# Patient Record
Sex: FEMALE | Race: WHITE | HISPANIC OR LATINO | Employment: FULL TIME | URBAN - METROPOLITAN AREA
[De-identification: names, ages, dates, MRNs, and addresses within clinical notes are randomized per-mention and may not be internally consistent; named-entity substitution may affect disease eponyms.]

---

## 2017-04-10 ENCOUNTER — GENERIC CONVERSION - ENCOUNTER (OUTPATIENT)
Dept: OTHER | Facility: OTHER | Age: 37
End: 2017-04-10

## 2017-04-10 DIAGNOSIS — O20.0 THREATENED ABORTION: ICD-10-CM

## 2017-04-11 LAB — HCG QUANTITATIVE (HISTORICAL): 45 MIU/ML

## 2017-04-13 ENCOUNTER — APPOINTMENT (EMERGENCY)
Dept: ULTRASOUND IMAGING | Facility: HOSPITAL | Age: 37
End: 2017-04-13
Payer: COMMERCIAL

## 2017-04-13 ENCOUNTER — HOSPITAL ENCOUNTER (EMERGENCY)
Facility: HOSPITAL | Age: 37
Discharge: HOME | End: 2017-04-13
Attending: EMERGENCY MEDICINE | Admitting: EMERGENCY MEDICINE
Payer: COMMERCIAL

## 2017-04-13 ENCOUNTER — GENERIC CONVERSION - ENCOUNTER (OUTPATIENT)
Dept: OTHER | Facility: OTHER | Age: 37
End: 2017-04-13

## 2017-04-13 VITALS
OXYGEN SATURATION: 100 % | HEART RATE: 72 BPM | SYSTOLIC BLOOD PRESSURE: 134 MMHG | RESPIRATION RATE: 17 BRPM | WEIGHT: 185 LBS | HEIGHT: 66 IN | DIASTOLIC BLOOD PRESSURE: 79 MMHG | BODY MASS INDEX: 29.73 KG/M2 | TEMPERATURE: 98.1 F

## 2017-04-13 DIAGNOSIS — O03.6: Primary | ICD-10-CM

## 2017-04-13 LAB
ABO GROUP BLD: NORMAL
ALBUMIN SERPL BCP-MCNC: 3.5 G/DL (ref 3.5–5)
ALP SERPL-CCNC: 64 U/L (ref 46–116)
ALT SERPL W P-5'-P-CCNC: 27 U/L (ref 12–78)
ANION GAP SERPL CALCULATED.3IONS-SCNC: 10 MMOL/L (ref 4–13)
AST SERPL W P-5'-P-CCNC: 18 U/L (ref 5–45)
B-HCG SERPL-ACNC: 5 MIU/ML
BACTERIA UR QL AUTO: ABNORMAL /HPF
BASOPHILS # BLD AUTO: 0.04 THOUSANDS/ΜL (ref 0–0.1)
BASOPHILS NFR BLD AUTO: 1 % (ref 0–1)
BILIRUB SERPL-MCNC: 0.5 MG/DL (ref 0.2–1)
BILIRUB UR QL STRIP: NEGATIVE
BLD GP AB SCN SERPL QL: NEGATIVE
BUN SERPL-MCNC: 10 MG/DL (ref 5–25)
CALCIUM SERPL-MCNC: 9 MG/DL (ref 8.3–10.1)
CHLORIDE SERPL-SCNC: 106 MMOL/L (ref 100–108)
CLARITY UR: ABNORMAL
CO2 SERPL-SCNC: 26 MMOL/L (ref 21–32)
COLOR UR: ABNORMAL
CREAT SERPL-MCNC: 0.83 MG/DL (ref 0.6–1.3)
EOSINOPHIL # BLD AUTO: 0.11 THOUSAND/ΜL (ref 0–0.61)
EOSINOPHIL NFR BLD AUTO: 1 % (ref 0–6)
ERYTHROCYTE [DISTWIDTH] IN BLOOD BY AUTOMATED COUNT: 11.9 % (ref 11.6–15.1)
GFR SERPL CREATININE-BSD FRML MDRD: >60 ML/MIN/1.73SQ M
GLUCOSE SERPL-MCNC: 85 MG/DL (ref 65–140)
GLUCOSE UR STRIP-MCNC: NEGATIVE MG/DL
HCT VFR BLD AUTO: 39.2 % (ref 34.8–46.1)
HGB BLD-MCNC: 12.8 G/DL (ref 11.5–15.4)
HGB UR QL STRIP.AUTO: ABNORMAL
INR PPP: 1.04 (ref 0.86–1.16)
KETONES UR STRIP-MCNC: NEGATIVE MG/DL
LEUKOCYTE ESTERASE UR QL STRIP: NEGATIVE
LYMPHOCYTES # BLD AUTO: 2.14 THOUSANDS/ΜL (ref 0.6–4.47)
LYMPHOCYTES NFR BLD AUTO: 26 % (ref 14–44)
MCH RBC QN AUTO: 29.2 PG (ref 26.8–34.3)
MCHC RBC AUTO-ENTMCNC: 32.7 G/DL (ref 31.4–37.4)
MCV RBC AUTO: 89 FL (ref 82–98)
MONOCYTES # BLD AUTO: 0.36 THOUSAND/ΜL (ref 0.17–1.22)
MONOCYTES NFR BLD AUTO: 4 % (ref 4–12)
NEUTROPHILS # BLD AUTO: 5.72 THOUSANDS/ΜL (ref 1.85–7.62)
NEUTS SEG NFR BLD AUTO: 68 % (ref 43–75)
NITRITE UR QL STRIP: NEGATIVE
NON-SQ EPI CELLS URNS QL MICRO: ABNORMAL /HPF
NRBC BLD AUTO-RTO: 0 /100 WBCS
PH UR STRIP.AUTO: 7 [PH] (ref 4.5–8)
PLATELET # BLD AUTO: 237 THOUSANDS/UL (ref 149–390)
PMV BLD AUTO: 11 FL (ref 8.9–12.7)
POTASSIUM SERPL-SCNC: 3.5 MMOL/L (ref 3.5–5.3)
PROT SERPL-MCNC: 7.4 G/DL (ref 6.4–8.2)
PROT UR STRIP-MCNC: NEGATIVE MG/DL
PROTHROMBIN TIME: 13.5 SECONDS (ref 12–14.3)
RBC # BLD AUTO: 4.39 MILLION/UL (ref 3.81–5.12)
RBC #/AREA URNS AUTO: ABNORMAL /HPF
RH BLD: POSITIVE
SODIUM SERPL-SCNC: 142 MMOL/L (ref 136–145)
SP GR UR STRIP.AUTO: 1.01 (ref 1–1.03)
UROBILINOGEN UR QL STRIP.AUTO: 1 E.U./DL
WBC # BLD AUTO: 8.39 THOUSAND/UL (ref 4.31–10.16)
WBC #/AREA URNS AUTO: ABNORMAL /HPF

## 2017-04-13 PROCEDURE — 99284 EMERGENCY DEPT VISIT MOD MDM: CPT

## 2017-04-13 PROCEDURE — 86900 BLOOD TYPING SEROLOGIC ABO: CPT | Performed by: EMERGENCY MEDICINE

## 2017-04-13 PROCEDURE — 36415 COLL VENOUS BLD VENIPUNCTURE: CPT | Performed by: EMERGENCY MEDICINE

## 2017-04-13 PROCEDURE — 80053 COMPREHEN METABOLIC PANEL: CPT | Performed by: EMERGENCY MEDICINE

## 2017-04-13 PROCEDURE — 87086 URINE CULTURE/COLONY COUNT: CPT | Performed by: EMERGENCY MEDICINE

## 2017-04-13 PROCEDURE — 85610 PROTHROMBIN TIME: CPT | Performed by: EMERGENCY MEDICINE

## 2017-04-13 PROCEDURE — 86901 BLOOD TYPING SEROLOGIC RH(D): CPT | Performed by: EMERGENCY MEDICINE

## 2017-04-13 PROCEDURE — 84702 CHORIONIC GONADOTROPIN TEST: CPT | Performed by: EMERGENCY MEDICINE

## 2017-04-13 PROCEDURE — 81001 URINALYSIS AUTO W/SCOPE: CPT | Performed by: EMERGENCY MEDICINE

## 2017-04-13 PROCEDURE — 85025 COMPLETE CBC W/AUTO DIFF WBC: CPT | Performed by: EMERGENCY MEDICINE

## 2017-04-13 PROCEDURE — 86850 RBC ANTIBODY SCREEN: CPT | Performed by: EMERGENCY MEDICINE

## 2017-04-13 PROCEDURE — 76815 OB US LIMITED FETUS(S): CPT

## 2017-04-14 ENCOUNTER — GENERIC CONVERSION - ENCOUNTER (OUTPATIENT)
Dept: OTHER | Facility: OTHER | Age: 37
End: 2017-04-14

## 2017-04-14 LAB — BACTERIA UR CULT: NORMAL

## 2017-04-18 ENCOUNTER — ALLSCRIPTS OFFICE VISIT (OUTPATIENT)
Dept: OTHER | Facility: OTHER | Age: 37
End: 2017-04-18

## 2017-04-25 ENCOUNTER — ALLSCRIPTS OFFICE VISIT (OUTPATIENT)
Dept: OTHER | Facility: OTHER | Age: 37
End: 2017-04-25

## 2017-04-27 ENCOUNTER — ALLSCRIPTS OFFICE VISIT (OUTPATIENT)
Dept: OTHER | Facility: OTHER | Age: 37
End: 2017-04-27

## 2017-04-27 PROCEDURE — 87591 N.GONORRHOEAE DNA AMP PROB: CPT | Performed by: OBSTETRICS & GYNECOLOGY

## 2017-04-27 PROCEDURE — 87661 TRICHOMONAS VAGINALIS AMPLIF: CPT | Performed by: OBSTETRICS & GYNECOLOGY

## 2017-04-27 PROCEDURE — 87491 CHLMYD TRACH DNA AMP PROBE: CPT | Performed by: OBSTETRICS & GYNECOLOGY

## 2017-04-28 ENCOUNTER — TRANSCRIBE ORDERS (OUTPATIENT)
Dept: LAB | Facility: HOSPITAL | Age: 37
End: 2017-04-28

## 2017-04-28 ENCOUNTER — LAB REQUISITION (OUTPATIENT)
Dept: LAB | Facility: HOSPITAL | Age: 37
End: 2017-04-28
Payer: COMMERCIAL

## 2017-04-28 DIAGNOSIS — R10.2 PELVIC AND PERINEAL PAIN: ICD-10-CM

## 2017-04-28 DIAGNOSIS — R10.2 ADNEXAL TENDERNESS, RIGHT: Primary | ICD-10-CM

## 2017-05-05 LAB
CHLAMYDIA DNA CVX QL NAA+PROBE: NORMAL
N GONORRHOEA DNA GENITAL QL NAA+PROBE: NORMAL

## 2017-05-09 ENCOUNTER — ALLSCRIPTS OFFICE VISIT (OUTPATIENT)
Dept: OTHER | Facility: OTHER | Age: 37
End: 2017-05-09

## 2017-05-15 LAB — MISCELLANEOUS LAB TEST RESULT: NORMAL

## 2017-06-13 ENCOUNTER — ALLSCRIPTS OFFICE VISIT (OUTPATIENT)
Dept: OTHER | Facility: OTHER | Age: 37
End: 2017-06-13

## 2018-01-13 VITALS
WEIGHT: 188 LBS | DIASTOLIC BLOOD PRESSURE: 66 MMHG | HEIGHT: 64 IN | BODY MASS INDEX: 32.1 KG/M2 | SYSTOLIC BLOOD PRESSURE: 116 MMHG

## 2018-01-13 VITALS — HEIGHT: 64 IN | BODY MASS INDEX: 32.44 KG/M2 | WEIGHT: 190 LBS

## 2018-01-13 VITALS
HEART RATE: 84 BPM | BODY MASS INDEX: 32.85 KG/M2 | SYSTOLIC BLOOD PRESSURE: 110 MMHG | DIASTOLIC BLOOD PRESSURE: 70 MMHG | WEIGHT: 191.38 LBS

## 2018-01-13 NOTE — RESULT NOTES
Verified Results  (B) PAP WITH HPV PLUS AND GONORRHEA AND CHLAMYDIA [id number] 12:00AM Negro Maldonado     Test Name Result Flag Reference   PAP, LIQUID-BASED ASC-US A    DIAGNOSIS:            Atypical squamous cells of undetermined                         significance (ASC-US)  ADEQUACY:             Satisfactory for evaluation /                         Endocervical/transformation zone component                         present  Scant cellularity  COMMENT:              This Pap smear was screened with the assistance                         of the [employer] ThinPrep(TM) Imaging System and                         screened by a cytotechnologist   SPECIMEN SOURCE:      PAP + HPV HIGH RISK + CT/GC, CERVIX  CLINICAL INFORMATION: LMP: 11/5/2016                        Provided Diagnosis Codes: Z01 419,Z11 3,Z11 51                                                Cervicovaginal cytology should be considered a                         screening procedure subject to false negatives                         and false positives  Results are more reliable                         when a satisfactory sample is obtained on a                         regular repetitive basis, and should be                         interpreted together with past and current                         clinical data  ELECTRONICALLY SIGNED   BY:                   Pathologist Review By: SERENITY Otero  Case                         Electronically Signed 11/11/2016   HPV HR (NON 16/18) Detected A    HPV 18+ Not Detected     HPV16+ Not Detected     CHLAMYDIA, LIQUID-BASED Not Detected     GONORRHEA, LIQUID-BASED Not Detected     HPV HR(NON 16/18) (2,5,6)  HPV 18+ (2,4,5,6)  HPV16+ (2,4,5,6)  CHLAMYDIA, LIQUID-BASED (3,6)  GONORRHEA, LIQUID-BASED (1,3,6)  (1)  Rare cross-reactivity may occur in this amplified DNA GC assay due to   certain strains of N  cinerea, N  subflava and N  lactamica    (2)  The jesse(R) HPV test is FDA-cleared for ThinPrep(R) specimens and detects genomic HPV DNA in the polymorphic L1 region in 14 subtypes:   Type 16, Type 18, and other high risk types   (08,15,37,67,35,95,33,36,80,21,99,60)  The test has been modified and   validated for use in SurePath(TM) specimens  (3)  Chlamydia trachomatis (CT) and Neisseria gonorrhoeae (NG)  qualitative   detection is performed on urogenital specimens using an FDA approved   platform  -  Starbates(TM) Qx Amplified DNA Assay tested with the BD Viper(TM)   System using Strand Displacement Amplification technology  -  The Rabixo Combo 2(R) Assay detects ribosomal RNA (rRNA) using   target capture and Transcription-Mediated Amplification (TMA)   technology  -  The jesse(R) CT/NG v2 0 detects DNA using Polymerase Chain Reaction   (PCR) technology  (4)  HPV types 16 and/or 18 that were Not Detected were undetectable or   below the pre-set threshold  (5)  The non-repeat rate for HPV genotyping assays varies from 5 to 15%  In   the NILM cytology category, there is a low positive predictive value   (PPV = 15-20%) for CIN2+ with a positive high risk HPV result  (6)  Results should be interpreted together with past and current clinical   and laboratory data

## 2018-01-14 VITALS
RESPIRATION RATE: 14 BRPM | SYSTOLIC BLOOD PRESSURE: 110 MMHG | BODY MASS INDEX: 31.92 KG/M2 | HEIGHT: 64 IN | HEART RATE: 66 BPM | TEMPERATURE: 98.5 F | WEIGHT: 187 LBS | DIASTOLIC BLOOD PRESSURE: 70 MMHG

## 2018-01-14 VITALS
DIASTOLIC BLOOD PRESSURE: 70 MMHG | HEIGHT: 64 IN | WEIGHT: 185 LBS | SYSTOLIC BLOOD PRESSURE: 112 MMHG | BODY MASS INDEX: 31.58 KG/M2

## 2018-01-15 NOTE — RESULT NOTES
Verified Results  Camilo Kevin Pathology Report [id number] 12:00AM Regina Kimball     Test Name Result Flag Reference   [facility] PATHOLOGY REPORT ATYPICAL A      [address] REPORT   Caren Meyer PATIENT ID:      [id number]     SPECIMEN SOURCE:      Cervix, Endocervix     CLINICAL DATA:      796 9, 795 05, R87 810      CLINICAL:     A  ENDOCERVICAL CURETTAGE     B  CERVICAL BIOPSY 6 O'CLOCK     C  CERVICAL BIOPSY 12 O'CLOCK     GROSS:     A  RECEIVED IN FORMALIN AND LABELED WITH PATIENT          IDENTIFICATION, IS AN AGGREGATE OF TAN, SOFT MUCOID TISSUE          MEASURING 1 0 X 1 0 X 0 3 CM  ENTIRELY SUBMITTED IN ONE          CASSETTE  B  RECEIVED IN FORMALIN AND LABELED WITH PATIENT          IDENTIFICATION, IS ONE TAN-WHITE PIECE OF TISSUE MEASURING          1 1 X 0 3 X 0 3 CM  ACCOMPANYING FRAGMENTS ARE ALSO          INCLUDED  ENTIRELY SUBMITTED IN ONE CASSETTE  C  RECEIVED IN FORMALIN AND LABELED WITH PATIENT          IDENTIFICATION, IS ONE TAN-WHITE PIECE OF TISSUE MEASURING          0 6 X 0 2 X 0 2 CM  ACCOMPANYING FRAGMENTS ARE ALSO          INCLUDED  ENTIRELY SUBMITTED IN ONE CASSETTE  DIAGNOSIS:     A  ENDOCERVICAL TISSUE, NEGATIVE FOR DYSPLASIA  B  ENDOCERVICAL AND EXOCERVICAL TISSUE WITH MILD CHRONIC          CERVICITIS AND SQUAMOUS METAPLASIA, NEGATIVE FOR DYSPLASIA  C  LOW GRADE SQUAMOUS INTRAEPITHELIAL LESION (LSIL, SHIVAM 1)  PREVIOUS PAP RESULT:  Report Date  Specimen Id  Diagnosis   ------------ ------------ ------------------------------------------  11/11/2016   [id number]    Atypical squamous cells of undetermined                               significance (ASC-US)                                                 SERENITY Clark  PATHOLOGIST                      This report was electronically signed                                              FINAL REPORT

## 2018-01-17 NOTE — RESULT NOTES
Verified Results  (1) HCG QUANT [id number] 11:58AM Lynn Catherine   REPORT COMMENT:  FASTING:NO     Test Name Result Flag Reference   HCG, TOTAL, QN 45 mIU/mL H    Reference Range  Nonpregnant or premenopausal    <5  Postmenopausal                 <10     Values from different assay methods may vary  The use of this assay to monitor or to diagnose   patients with cancer or any condition unrelated  to pregnancy has not been cleared or approved by  the FDA or the  of the assay

## 2018-01-18 NOTE — MISCELLANEOUS
Message   Recorded as Task   Date: 2017 10:02 AM, Created By: Sharonda Patel   Task Name: Call Back   Assigned To: Sg Mendosa   Regarding Patient: David Pichardo, Status: In Progress   Comment:    Clara Seymour  10:02 AM     TASK CREATED  Caller: Self; Results Inquiry; [phone] (Home)  pls call pt, she would like her blood results which were done @ [facility] MON 4/10/17,  she is bleeding, concerned & will soon go to er,  pts # [phone]   Rosemary Neal  10:14 AM     TASK IN PROGRESS   Rosemary Neal  10:18 AM     TASK EDITED  Tried to call - answered first time but kept breaking in and out  Was able to hear that she is currently in the ER - tried to verify which one and get more info but line was cut off  Tried to call again but no answer - vm was not set up  Will try again later to find out what is going on  Clara Seymour  12:47 PM     TASK EDITED  pt went to [facility] ER, she had miscarriage, set appt for fu er to miscarriage with G87 in [place]   Rosemary Neal  12:57 PM     TASK EDITED  copied messages to note  Active Problems    1  ASCUS with positive high risk HPV (796 9)   2  Cervical high risk HPV (human papillomavirus) test positive (795 05) (R87 810)   3  Postcoital bleeding (626 7) (N93 0)   4  Threatened  (640 00) (O20 0)    Current Meds   1  No Reported Medications Recorded    Allergies    1   No Known Drug Allergies    Signatures   Electronically signed by : Oda Osler, ; 2017 12:58PM EST                       (Author)

## 2019-01-09 ENCOUNTER — OFFICE VISIT (OUTPATIENT)
Dept: OBGYN CLINIC | Facility: MEDICAL CENTER | Age: 39
End: 2019-01-09
Payer: COMMERCIAL

## 2019-01-09 VITALS — WEIGHT: 190.8 LBS | DIASTOLIC BLOOD PRESSURE: 86 MMHG | SYSTOLIC BLOOD PRESSURE: 122 MMHG | BODY MASS INDEX: 32.75 KG/M2

## 2019-01-09 DIAGNOSIS — Z87.42 H/O ABNORMAL CERVICAL PAPANICOLAOU SMEAR: ICD-10-CM

## 2019-01-09 DIAGNOSIS — Z30.432 ENCOUNTER FOR IUD REMOVAL: Primary | ICD-10-CM

## 2019-01-09 DIAGNOSIS — Z30.09 ENCOUNTER FOR COUNSELING REGARDING INITIATION OF OTHER CONTRACEPTIVE MEASURE: ICD-10-CM

## 2019-01-09 DIAGNOSIS — Z01.419 ENCOUNTER FOR GYNECOLOGICAL EXAMINATION WITHOUT ABNORMAL FINDING: ICD-10-CM

## 2019-01-09 PROCEDURE — 99213 OFFICE O/P EST LOW 20 MIN: CPT | Performed by: PHYSICIAN ASSISTANT

## 2019-01-09 PROCEDURE — 87624 HPV HI-RISK TYP POOLED RSLT: CPT | Performed by: PHYSICIAN ASSISTANT

## 2019-01-09 PROCEDURE — G0143 SCR C/V CYTO,THINLAYER,RESCR: HCPCS | Performed by: PHYSICIAN ASSISTANT

## 2019-01-09 PROCEDURE — 58301 REMOVE INTRAUTERINE DEVICE: CPT | Performed by: PHYSICIAN ASSISTANT

## 2019-01-09 RX ORDER — NORETHINDRONE ACETATE AND ETHINYL ESTRADIOL 1MG-20(21)
1 KIT ORAL DAILY
Qty: 90 TABLET | Refills: 4 | Status: SHIPPED | OUTPATIENT
Start: 2019-01-09 | End: 2019-07-30 | Stop reason: ALTCHOICE

## 2019-01-09 NOTE — PROGRESS NOTES
Iud removal  Date/Time: 1/9/2019 6:22 PM  Performed by: Sudarshan Jackson by: Cristal Reeec     Consent:     Consent obtained:  Written    Consent given by:  Patient    Procedure risks and benefits discussed: yes      Patient questions answered: yes      Patient agrees, verbalizes understanding, and wants to proceed: yes    Procedure:     Removed with no complications: yes      Other reason for removal:  Irreg bleeding and dyspareunia

## 2019-01-09 NOTE — PROGRESS NOTES
Assessment/Plan:    H/O abnormal cervical Papanicolaou smear  H/o abnormal PAP (ASCUS +other high risk HPV) - has not f/u since that time  PAP collected today    Encounter for IUD removal  See procedure note    Encounter for counseling regarding initiation of other contraceptive measure     Contraceptive options were reviewed, including hormonal methods, both combination (pill, patch, vaginal ring) and progesterone-only (pill, Depo Provera and Nexplanon), intrauterine devices (Mirena, Superior, Kerri Glee, and Paragard), barrier methods (condoms, diaphragm) and male/female sterilization  The mechanisms, risks, benefits and side effects of all methods were discussed  All questions have been answered to her satisfaction  Interested in OCP start  No h/o VTE, migraine  Non smoker  Reviewed taking same time daily  F/u PRN       Diagnoses and all orders for this visit:    Encounter for IUD removal    Encounter for counseling regarding initiation of other contraceptive measure  -     norethindrone-ethinyl estradiol (JUNEL FE 1/20) 1-20 MG-MCG per tablet; Take 1 tablet by mouth daily    Encounter for gynecological examination without abnormal finding  -     Liquid-based pap, screening    H/O abnormal cervical Papanicolaou smear          Subjective:      Patient ID: Karli Oliveros is a 41 y o  female      Pt presents for IUD removal  States she's been experiencing pain w/ IC and irreg bleeding since IUD insertion in 2017 (was confirmed in correct position w/ US and was tx for possible PID - STD testing was neg)  Requests removal at this time    Interested in OCP start following removal  Declines screening for STD - monogamous w/     Also has not f/u since abnormal PAP in 2016 (ASCUS +other high risk HPV)                The following portions of the patient's history were reviewed and updated as appropriate: allergies, current medications, past family history, past medical history, past social history, past surgical history and problem list     Review of Systems   Constitutional: Negative for appetite change, fatigue and unexpected weight change  Respiratory: Negative for chest tightness and shortness of breath  Cardiovascular: Negative for chest pain, palpitations and leg swelling  Gastrointestinal: Negative for abdominal pain, constipation, diarrhea, nausea and vomiting  Genitourinary: Positive for dyspareunia and menstrual problem  Negative for difficulty urinating, pelvic pain and vaginal discharge  Musculoskeletal: Negative for arthralgias and myalgias  Neurological: Negative for dizziness, light-headedness and headaches  Psychiatric/Behavioral: Negative for dysphoric mood  The patient is not nervous/anxious  All other systems reviewed and are negative  Objective:      /86 (BP Location: Left arm, Patient Position: Sitting, Cuff Size: Standard)   Wt 86 5 kg (190 lb 12 8 oz)   BMI 32 75 kg/m²          Physical Exam   Constitutional: She is oriented to person, place, and time  She appears well-developed and well-nourished  HENT:   Head: Normocephalic and atraumatic  Abdominal: Soft  There is no tenderness  Hernia confirmed negative in the right inguinal area and confirmed negative in the left inguinal area  Genitourinary: Vagina normal and uterus normal  Pelvic exam was performed with patient supine  There is no rash, tenderness, lesion or injury on the right labia  There is no rash, tenderness, lesion or injury on the left labia  Cervix exhibits no motion tenderness, no discharge and no friability  Right adnexum displays no mass, no tenderness and no fullness  Left adnexum displays no mass, no tenderness and no fullness  No erythema, tenderness or bleeding in the vagina  No signs of injury around the vagina  No vaginal discharge found  Genitourinary Comments: IUD strings visible   Neurological: She is alert and oriented to person, place, and time  Skin: Skin is warm and dry     Psychiatric: She has a normal mood and affect  Nursing note and vitals reviewed

## 2019-01-09 NOTE — ASSESSMENT & PLAN NOTE
Contraceptive options were reviewed, including hormonal methods, both combination (pill, patch, vaginal ring) and progesterone-only (pill, Depo Provera and Nexplanon), intrauterine devices (Mirena, Superior, Verlan Chill, and Paragard), barrier methods (condoms, diaphragm) and male/female sterilization  The mechanisms, risks, benefits and side effects of all methods were discussed  All questions have been answered to her satisfaction  Interested in OCP start  No h/o VTE, migraine   Non smoker  Reviewed taking same time daily  F/u PRN

## 2019-01-10 LAB
HPV HR 12 DNA CVX QL NAA+PROBE: NEGATIVE
HPV16 DNA CVX QL NAA+PROBE: NEGATIVE
HPV18 DNA CVX QL NAA+PROBE: NEGATIVE

## 2019-01-14 LAB
LAB AP GYN PRIMARY INTERPRETATION: NORMAL
Lab: NORMAL

## 2019-01-15 ENCOUNTER — TELEPHONE (OUTPATIENT)
Dept: OBGYN CLINIC | Facility: CLINIC | Age: 39
End: 2019-01-15

## 2019-01-15 NOTE — TELEPHONE ENCOUNTER
Patient called back  Reviewed pap results with her  Patient has questions regarding irregular bleeding  Had IUD removed last week  Started Junel that evening  Started having some bleeding so she stopped the pill because she thought is was her period  Did not take pill x 2 days  Advised patient to take 2 pills today and 2 pills tomorrow in divided doses  May cause nausea  Advised back up protection for the rest of this cycle  Reviewed breakthrough bleeding  Stressed importance of taking her pill every day at approx the same time  Explained she will have her period during the placebo week  Patient verbalizes understanding

## 2019-01-15 NOTE — TELEPHONE ENCOUNTER
----- Message from Sophia Benson PA-C sent at 1/15/2019  7:43 AM EST -----  Cytology WNL  HPV results neg  Please notify pt  thanks

## 2019-03-26 ENCOUNTER — TELEPHONE (OUTPATIENT)
Dept: OBGYN CLINIC | Facility: CLINIC | Age: 39
End: 2019-03-26

## 2019-03-26 DIAGNOSIS — O20.9 BLEEDING IN EARLY PREGNANCY: Primary | ICD-10-CM

## 2019-03-26 NOTE — TELEPHONE ENCOUNTER
Patient is aware order for the HCG order in her chart and faxing it to [facility] on [address]

## 2019-03-26 NOTE — TELEPHONE ENCOUNTER
Spoke with pt - she had the IUD removed in January due to irregular bleeding  Since having it removed, she has been taking Junel for her birth control - missed pills and did not use back up birth control the first month starting the OCP  She has 1 period that lasted 3 days and was very light, but now she is bleeding during intercourse - only see's when wiping  NO blood in the toilet  She took an HPT - not sure if it was positive or negative  She would like to do an HCG  OK to order? Please advise  Thanks!

## 2019-03-26 NOTE — TELEPHONE ENCOUNTER
Pt had her iud removed in [facility] had one period for 3 days - very light; now she is bleeding with intercourse  She had a miscarriage 2 yrs ago-she feels like it's the same thing again  She had 5 children and her results don't show up on the home pt

## 2019-03-28 ENCOUNTER — TELEPHONE (OUTPATIENT)
Dept: OBGYN CLINIC | Facility: CLINIC | Age: 39
End: 2019-03-28

## 2019-03-28 LAB — B-HCG SERPL-ACNC: <2 MIU/ML

## 2019-03-28 NOTE — TELEPHONE ENCOUNTER
Patient called - she is aware that her HCG is negative  She wanted to Paula Schmidt to discuss her irregular bleeding  Made an appointment for her for 04/02

## 2019-03-29 ENCOUNTER — TELEPHONE (OUTPATIENT)
Dept: OBGYN CLINIC | Facility: CLINIC | Age: 39
End: 2019-03-29

## 2019-03-29 NOTE — TELEPHONE ENCOUNTER
----- Message from [facility] sent at 3/29/2019  8:01 AM EDT -----  Pls advise HCG is NEGATIVE  Thanks

## 2019-07-30 ENCOUNTER — OFFICE VISIT (OUTPATIENT)
Dept: OBGYN CLINIC | Facility: MEDICAL CENTER | Age: 39
End: 2019-07-30
Payer: COMMERCIAL

## 2019-07-30 VITALS — SYSTOLIC BLOOD PRESSURE: 128 MMHG | DIASTOLIC BLOOD PRESSURE: 76 MMHG | BODY MASS INDEX: 31.1 KG/M2 | WEIGHT: 181.2 LBS

## 2019-07-30 DIAGNOSIS — L91.8 SKIN TAG: ICD-10-CM

## 2019-07-30 DIAGNOSIS — B37.9 CANDIDIASIS: Primary | ICD-10-CM

## 2019-07-30 PROCEDURE — 11200 RMVL SKIN TAGS UP TO&INC 15: CPT | Performed by: PHYSICIAN ASSISTANT

## 2019-07-30 PROCEDURE — 99213 OFFICE O/P EST LOW 20 MIN: CPT | Performed by: PHYSICIAN ASSISTANT

## 2019-07-30 RX ORDER — NYSTATIN AND TRIAMCINOLONE ACETONIDE 100000; 1 [USP'U]/G; MG/G
OINTMENT TOPICAL 2 TIMES DAILY
Qty: 30 G | Refills: 0 | Status: SHIPPED | OUTPATIENT
Start: 2019-07-30 | End: 2020-09-29

## 2019-07-30 RX ORDER — ALBUTEROL SULFATE 2.5 MG/3ML
SOLUTION RESPIRATORY (INHALATION)
COMMUNITY

## 2019-07-30 RX ORDER — FLUTICASONE PROPIONATE 50 MCG
SPRAY, SUSPENSION (ML) NASAL
COMMUNITY

## 2019-07-30 NOTE — ASSESSMENT & PLAN NOTE
Small skin tag on back of L leg - TCA applied topically - pt tolerated well  Aftercare instructions reviewed

## 2019-07-30 NOTE — ASSESSMENT & PLAN NOTE
Exam findings c/w candidiasis  C/o pruritus - hydrocortisone helping  Given topical mycolog, discussed use until area improved    rev'd proper hygiene practices, keeping area clean/dry as able

## 2019-07-30 NOTE — PROGRESS NOTES
Assessment/Plan:    Skin tag  Small skin tag on back of L leg - TCA applied topically - pt tolerated well  Aftercare instructions reviewed      Candidiasis  Exam findings c/w candidiasis  C/o pruritus - hydrocortisone helping  Given topical mycolog, discussed use until area improved    rev'd proper hygiene practices, keeping area clean/dry as able         Diagnoses and all orders for this visit:    Candidiasis  -     nystatin-triamcinolone (MYCOLOG-II) ointment; Apply topically 2 (two) times a day    Skin tag    Other orders  -     fluticasone (FLONASE) 50 mcg/act nasal spray; fluticasone propionate 50 mcg/actuation nasal spray,suspension  -     VENTOLIN  (90 Base) MCG/ACT inhaler; Inhale 2 puffs every 4 (four) hours as needed  -     albuterol (2 5 mg/3 mL) 0 083 % nebulizer solution; albuterol sulfate 2 5 mg/3 mL (0 083 %) solution for nebulization        Subjective:      Patient ID: Jason Alberto is a 46 y o  female  Pt presents for eval of pruritic rash, skin tag on back of thigh  For last several days, had noted increasing pruritus in groin area bilaterally  Applied hydrocortisone 1% and did help w/ pruritus    No vag d/c, irreg bleeding, pelvic pain    Admits to increased sweating, trying to keep area clean/dry    Also has 'lump' on back of thigh would like to have removed        The following portions of the patient's history were reviewed and updated as appropriate: allergies, current medications, past family history, past medical history, past social history, past surgical history and problem list     Review of Systems   Constitutional: Negative for appetite change, fatigue and unexpected weight change  Respiratory: Negative for chest tightness and shortness of breath  Cardiovascular: Negative for chest pain, palpitations and leg swelling  Gastrointestinal: Negative for abdominal pain, constipation, diarrhea, nausea and vomiting     Genitourinary: Negative for difficulty urinating, dyspareunia, menstrual problem, pelvic pain and vaginal discharge  Musculoskeletal: Negative for arthralgias and myalgias  Skin: Positive for rash  Neurological: Negative for dizziness, light-headedness and headaches  Psychiatric/Behavioral: Negative for dysphoric mood  The patient is not nervous/anxious  All other systems reviewed and are negative  Objective:      /76   Wt 82 2 kg (181 lb 3 2 oz)   LMP  (Within Weeks)   Breastfeeding? No   BMI 31 10 kg/m²          Physical Exam   Constitutional: She is oriented to person, place, and time  She appears well-developed and well-nourished  HENT:   Head: Normocephalic and atraumatic  Genitourinary: Pelvic exam was performed with patient supine  Genitourinary Comments: Hyperpigmented rash noted in inguinal folds b/l     Neurological: She is alert and oriented to person, place, and time  Skin: Skin is warm and dry  Psychiatric: She has a normal mood and affect  Nursing note and vitals reviewed

## 2019-11-12 ENCOUNTER — OFFICE VISIT (OUTPATIENT)
Dept: OBGYN CLINIC | Facility: MEDICAL CENTER | Age: 39
End: 2019-11-12
Payer: COMMERCIAL

## 2019-11-12 VITALS — SYSTOLIC BLOOD PRESSURE: 118 MMHG | DIASTOLIC BLOOD PRESSURE: 66 MMHG | WEIGHT: 184 LBS | BODY MASS INDEX: 31.58 KG/M2

## 2019-11-12 DIAGNOSIS — N93.0 PCB (POST COITAL BLEEDING): ICD-10-CM

## 2019-11-12 DIAGNOSIS — Z11.3 SCREENING EXAMINATION FOR STD (SEXUALLY TRANSMITTED DISEASE): Primary | ICD-10-CM

## 2019-11-12 DIAGNOSIS — Z30.41 ORAL CONTRACEPTIVE PILL SURVEILLANCE: ICD-10-CM

## 2019-11-12 PROCEDURE — 99214 OFFICE O/P EST MOD 30 MIN: CPT | Performed by: PHYSICIAN ASSISTANT

## 2019-11-12 PROCEDURE — 87660 TRICHOMONAS VAGIN DIR PROBE: CPT | Performed by: PHYSICIAN ASSISTANT

## 2019-11-12 PROCEDURE — 87480 CANDIDA DNA DIR PROBE: CPT | Performed by: PHYSICIAN ASSISTANT

## 2019-11-12 PROCEDURE — 87591 N.GONORRHOEAE DNA AMP PROB: CPT | Performed by: PHYSICIAN ASSISTANT

## 2019-11-12 PROCEDURE — 87510 GARDNER VAG DNA DIR PROBE: CPT | Performed by: PHYSICIAN ASSISTANT

## 2019-11-12 PROCEDURE — 87491 CHLMYD TRACH DNA AMP PROBE: CPT | Performed by: PHYSICIAN ASSISTANT

## 2019-11-12 RX ORDER — LEVONORGESTREL AND ETHINYL ESTRADIOL 0.15-0.03
1 KIT ORAL DAILY
Qty: 90 TABLET | Refills: 4 | Status: SHIPPED | OUTPATIENT
Start: 2019-11-12 | End: 2020-09-29

## 2019-11-12 NOTE — ASSESSMENT & PLAN NOTE
Will r/o infection, cx collected today  Will send for imaging, f/u with results when available  Cervical friability noted on exam, possible ectropion d/t hormonal contra use    If workup WNL, disc d/c use of OCPs, tx w/ silver nitrate, cryo, or ablation to help reduce recurrence of PCB

## 2019-11-12 NOTE — PROGRESS NOTES
Assessment/Plan:    PCB (post coital bleeding)  Will r/o infection, cx collected today  Will send for imaging, f/u with results when available  Cervical friability noted on exam, possible ectropion d/t hormonal contra use    If workup WNL, disc d/c use of OCPs, tx w/ silver nitrate, cryo, or ablation to help reduce recurrence of PCB       Diagnoses and all orders for this visit:    Screening examination for STD (sexually transmitted disease)  -     VAGINOSIS DNA PROBE (AFFIRM)  -     Chlamydia/GC amplified DNA by PCR    PCB (post coital bleeding)  -     US pelvis complete w transvaginal; Future    Oral contraceptive pill surveillance  -     levonorgestrel-ethinyl estradiol (NORDETTE) 0 15-30 MG-MCG per tablet; Take 1 tablet by mouth daily        Subjective:      Patient ID: Matteo Clifton is a 46 y o  female  Pt presents for eval of postcoital bleeding  S/p Mirena IUD removal in Jan 2019 d/t ongoing cramping/pain/irreg bleeding  Was hopeful that irreg bleeding/PCB would resolve after removal but has persisted  Currently taking COCPs for contra, taking same time daily, no skipped pills  Notes PCB occurring w/ every sexual encounter w/ , blood bright red, resolves shortly after  Denies pain w/ bleeding  Monogamous, no abn d/c noted  No pelvic pain   Denies any urinary symptoms  No change in bowel/bladder habits          The following portions of the patient's history were reviewed and updated as appropriate: allergies, current medications, past family history, past medical history, past social history, past surgical history and problem list     Review of Systems   Constitutional: Negative for appetite change, fatigue and unexpected weight change  Respiratory: Negative for chest tightness and shortness of breath  Cardiovascular: Negative for chest pain, palpitations and leg swelling  Gastrointestinal: Negative for abdominal pain, constipation, diarrhea, nausea and vomiting     Genitourinary: Positive for vaginal bleeding  Negative for difficulty urinating, dyspareunia, menstrual problem, pelvic pain and vaginal discharge  Musculoskeletal: Negative for arthralgias and myalgias  Neurological: Negative for dizziness, light-headedness and headaches  Psychiatric/Behavioral: Negative for dysphoric mood  The patient is not nervous/anxious  All other systems reviewed and are negative  Objective:      /66   Wt 83 5 kg (184 lb)   LMP  (LMP Unknown)   Breastfeeding? No   BMI 31 58 kg/m²          Physical Exam   Constitutional: She is oriented to person, place, and time  She appears well-developed and well-nourished  HENT:   Head: Normocephalic and atraumatic  Abdominal: Soft  There is no tenderness  Hernia confirmed negative in the right inguinal area and confirmed negative in the left inguinal area  Genitourinary: Vagina normal and uterus normal  Pelvic exam was performed with patient supine  There is no rash, tenderness, lesion or injury on the right labia  There is no rash, tenderness, lesion or injury on the left labia  Cervix exhibits friability  Cervix exhibits no motion tenderness and no discharge  Right adnexum displays no mass, no tenderness and no fullness  Left adnexum displays no mass, no tenderness and no fullness  No erythema, tenderness or bleeding in the vagina  No signs of injury around the vagina  No vaginal discharge found  Neurological: She is alert and oriented to person, place, and time  Skin: Skin is warm and dry  Psychiatric: She has a normal mood and affect  Nursing note and vitals reviewed

## 2019-11-14 ENCOUNTER — TELEPHONE (OUTPATIENT)
Dept: OBGYN CLINIC | Facility: CLINIC | Age: 39
End: 2019-11-14

## 2019-11-14 LAB
C TRACH DNA SPEC QL NAA+PROBE: NEGATIVE
CANDIDA RRNA VAG QL PROBE: NEGATIVE
G VAGINALIS RRNA GENITAL QL PROBE: NEGATIVE
N GONORRHOEA DNA SPEC QL NAA+PROBE: NEGATIVE
T VAGINALIS RRNA GENITAL QL PROBE: NEGATIVE

## 2019-11-18 ENCOUNTER — HOSPITAL ENCOUNTER (OUTPATIENT)
Dept: ULTRASOUND IMAGING | Facility: HOSPITAL | Age: 39
Discharge: HOME | End: 2019-11-18
Payer: COMMERCIAL

## 2019-11-18 DIAGNOSIS — N93.0 PCB (POST COITAL BLEEDING): ICD-10-CM

## 2019-11-18 PROCEDURE — 76830 TRANSVAGINAL US NON-OB: CPT

## 2019-11-18 PROCEDURE — 76856 US EXAM PELVIC COMPLETE: CPT

## 2019-11-20 ENCOUNTER — TELEPHONE (OUTPATIENT)
Dept: OBGYN CLINIC | Facility: CLINIC | Age: 39
End: 2019-11-20

## 2019-11-20 NOTE — TELEPHONE ENCOUNTER
Stella Dickinson from [facility] radiology called with significant findings on pelvic ultasound

## 2019-11-26 ENCOUNTER — TELEPHONE (OUTPATIENT)
Dept: OBGYN CLINIC | Facility: CLINIC | Age: 39
End: 2019-11-26

## 2019-11-26 NOTE — TELEPHONE ENCOUNTER
----- Message from [address] [place] [address] sent at 11/26/2019 11:16 AM EST -----  This is Briseida's patient  Please notify that there is a possible endometrial polyp present  I would recommend patient schedule an office visit with a physician to discuss possible D and C hysteroscopy  Thank you!

## 2019-11-27 NOTE — TELEPHONE ENCOUNTER
Pt contacted and advised as directed  Pt scheduled for wind agp at 3:40pm 12/05 with dr Festus Taylor as pt has seen dr Festus Taylor prior and prefers provider and location

## 2019-12-05 ENCOUNTER — OFFICE VISIT (OUTPATIENT)
Dept: OBGYN CLINIC | Facility: MEDICAL CENTER | Age: 39
End: 2019-12-05
Payer: COMMERCIAL

## 2019-12-05 VITALS
RESPIRATION RATE: 14 BRPM | WEIGHT: 190.6 LBS | HEIGHT: 65 IN | BODY MASS INDEX: 31.75 KG/M2 | SYSTOLIC BLOOD PRESSURE: 110 MMHG | DIASTOLIC BLOOD PRESSURE: 70 MMHG

## 2019-12-05 DIAGNOSIS — R93.89 ABNORMAL ULTRASOUND OF PELVIS: ICD-10-CM

## 2019-12-05 DIAGNOSIS — N93.9 ABNORMAL UTERINE BLEEDING (AUB): Primary | ICD-10-CM

## 2019-12-05 DIAGNOSIS — N93.0 POSTCOITAL BLEEDING: ICD-10-CM

## 2019-12-05 PROCEDURE — 99214 OFFICE O/P EST MOD 30 MIN: CPT | Performed by: OBSTETRICS & GYNECOLOGY

## 2019-12-05 PROCEDURE — 58100 BIOPSY OF UTERUS LINING: CPT | Performed by: OBSTETRICS & GYNECOLOGY

## 2019-12-05 PROCEDURE — 88305 TISSUE EXAM BY PATHOLOGIST: CPT | Performed by: PATHOLOGY

## 2019-12-05 NOTE — PROGRESS NOTES
Procedures  Endometrial Biopsy Procedure Note    Pre-operative Diagnosis:   Abnormal uterine bleeding  Postcoital bleeding  Abnormal ultrasound  Post-operative Diagnosis: same    Indications: abnormal uterine bleeding    Procedure Details    Urine pregnancy test was done today and result was negative  The risks (including infection, bleeding, pain, and uterine perforation) and benefits of the procedure were explained to the patient and Verbal informed consent was obtained  The patient was placed in the dorsal lithotomy position  Bimanual exam showed the uterus to be in the anteroflexed position  A speculum inserted in the vagina, and the cervix prepped with povidone iodine  A Pipelle endometrial aspirator was used to sample the endometrium  Uterus sounded to 8 cm   A(n) abundant amount of tissue was obtained  Sample was sent for pathologic examination  ECC performed last     Condition:  Stable    Complications:  None    Plan:    The patient was advised to call for any fever or for prolonged or severe pain or bleeding  She was advised to use OTC analgesics as needed for mild to moderate pain  She was advised to avoid vaginal intercourse for 48 hours or until the bleeding has completely stopped  Attending Physician Documentation:  I was present for or participated in the entire procedure, including opening and closing

## 2019-12-05 NOTE — PROGRESS NOTES
Assessment/Plan:      Diagnoses and all orders for this visit:    Abnormal uterine bleeding (AUB)    Postcoital bleeding    Abnormal ultrasound of pelvis        Schedule hysteroscopy D&C in hospital, not on a Friday  Subjective:     Patient ID: Jennifer Wilder is a 46 y o  female  Patient is a 29-year-old female, P5 with a complaint of postcoital and abnormal uterine bleeding  Patient denies pelvic or vaginal pain  Patient denies vulvar vaginal or anal irritation  Previous workup included Pap and HPV which were negative  Affirm was negative  Screen for gonorrhea and chlamydia was negative  Ultrasound showed a nodule in the endometrium with Doppler flow  Review of Systems      Objective:     Physical Exam   Constitutional: She appears well-developed and well-nourished  Neck: No thyromegaly present  Abdominal: Hernia confirmed negative in the right inguinal area and confirmed negative in the left inguinal area  Genitourinary: Vagina normal  Rectal exam shows no external hemorrhoid  There is no rash, tenderness, lesion or injury on the right labia  There is no rash, tenderness, lesion or injury on the left labia  Uterus is not deviated, not enlarged, not fixed and not tender  Cervix exhibits no motion tenderness and no discharge  Right adnexum displays no mass, no tenderness and no fullness  Left adnexum displays no mass, no tenderness and no fullness  No erythema, tenderness or bleeding in the vagina  No foreign body in the vagina  No signs of injury around the vagina  No vaginal discharge found  Genitourinary Comments: Significant uterine prolapse 2nd to 3rd degree  Lymphadenopathy: No inguinal adenopathy noted on the right or left side

## 2019-12-13 PROBLEM — N93.0 POSTCOITAL BLEEDING: Status: ACTIVE | Noted: 2019-12-13

## 2019-12-13 PROBLEM — R93.89 ABNORMAL ULTRASOUND OF PELVIS: Status: ACTIVE | Noted: 2019-12-13

## 2019-12-13 PROBLEM — N93.9 ABNORMAL UTERINE BLEEDING (AUB): Status: ACTIVE | Noted: 2019-12-13

## 2019-12-18 ENCOUNTER — TELEPHONE (OUTPATIENT)
Dept: OBGYN CLINIC | Facility: MEDICAL CENTER | Age: 39
End: 2019-12-18

## 2019-12-18 NOTE — TELEPHONE ENCOUNTER
----- Message from Judi Davis MD sent at 12/16/2019  1:30 PM EST -----  Please notify patient her endometrial biopsy and ECC were benign

## 2019-12-18 NOTE — TELEPHONE ENCOUNTER
Try calling the patient on 12/18/2019 there was no answer I send out a result card on 12/18/2019 to inform her that her Endometrial biopsy was negative

## 2020-09-28 ENCOUNTER — APPOINTMENT (OUTPATIENT)
Dept: LAB | Facility: HOSPITAL | Age: 40
End: 2020-09-28
Attending: OBSTETRICS & GYNECOLOGY
Payer: COMMERCIAL

## 2020-09-28 ENCOUNTER — TELEPHONE (OUTPATIENT)
Dept: OBGYN CLINIC | Facility: CLINIC | Age: 40
End: 2020-09-28

## 2020-09-28 DIAGNOSIS — N92.6 MISSED PERIOD: Primary | ICD-10-CM

## 2020-09-28 DIAGNOSIS — N92.6 MISSED PERIOD: ICD-10-CM

## 2020-09-28 LAB — B-HCG SERPL-ACNC: ABNORMAL MIU/ML

## 2020-09-28 PROCEDURE — 36415 COLL VENOUS BLD VENIPUNCTURE: CPT

## 2020-09-28 PROCEDURE — 84702 CHORIONIC GONADOTROPIN TEST: CPT

## 2020-09-28 NOTE — TELEPHONE ENCOUNTER
Pt states she feels pregnant  Urine tests do not work for her  She lost a pregnancy in January  And wants to speak to a nurse  States that with all 5 of her pregnancies she needs to be scanned  Please call pt      Please advise

## 2020-09-28 NOTE — TELEPHONE ENCOUNTER
Pt contacted and advised as directed  lmp 08/15/2020  Pt stated she wants to be seen today because she is bleeding  I asked pt how heavy it could be her cycle  Pt stated its only spotting and she has been high risk in her other 5 pregnancies  I asked pt if she had intercourse in the last 24-48 hours to spotting  Pt stated yes she had relations and then she had the spotting  I advised normal to spot after intercourse as her cervix is sensitive  Pt stated she wants to know when she can be seen for her first appt  I advised pt based on her LMP and what she is stating we can get her in with in the next two weeks but I have to check the schedule  Pt stated oh no [place] is so paz to get an appt and hung up

## 2020-09-29 ENCOUNTER — ULTRASOUND (OUTPATIENT)
Dept: OBGYN CLINIC | Facility: CLINIC | Age: 40
End: 2020-09-29
Payer: COMMERCIAL

## 2020-09-29 VITALS — WEIGHT: 191.8 LBS | BODY MASS INDEX: 31.92 KG/M2 | TEMPERATURE: 97.2 F

## 2020-09-29 DIAGNOSIS — Z87.59 HISTORY OF PRETERM PREMATURE RUPTURE OF MEMBRANES (PPROM): ICD-10-CM

## 2020-09-29 DIAGNOSIS — N91.1 SECONDARY AMENORRHEA: Primary | ICD-10-CM

## 2020-09-29 PROCEDURE — 76817 TRANSVAGINAL US OBSTETRIC: CPT | Performed by: STUDENT IN AN ORGANIZED HEALTH CARE EDUCATION/TRAINING PROGRAM

## 2020-09-29 PROCEDURE — 99215 OFFICE O/P EST HI 40 MIN: CPT | Performed by: STUDENT IN AN ORGANIZED HEALTH CARE EDUCATION/TRAINING PROGRAM

## 2020-09-29 RX ORDER — PNV NO.95/FERROUS FUM/FOLIC AC 28MG-0.8MG
1 TABLET ORAL DAILY
Qty: 90 TABLET | Refills: 4 | Status: SHIPPED | OUTPATIENT
Start: 2020-09-29

## 2020-09-29 NOTE — PROGRESS NOTES
EARLY PREGNANCY ULTRASOUND    Ultrasound Probe Disinfection    A transvaginal ultrasound was performed  Prior to use, disinfection was performed with High Level Disinfection Process (Confov[employer])  Probe serial number SLOGA-B: [id number] was used    Heather Arevalo MD  20  4:18 PM    SUBJECTIVE    Pregnancy Ultrasound (Early US  LMP: 8/15  6w3d  TIAGO: 21  denies any nausea/vomiting  did have a lot of cramping and VB yesterday after intercourse but denied having any VB at any other time  VB has stopped according to pt  cramping was like menstrual cramps  denies any fatigue  does have breast tenderness  this was an unplanned pregnancy  )      HPI: Rylie Benson is a 45 y o  [id number] female here today for early pregnancy ultrasound  Patient's last menstrual period was 08/15/2020 (exact date)    Menses are regular  This pregnancy was unplanned  She is accompanied by her  and son  OB history is significant for:    G1   full term F 7lbs  G2   full term F 7lbs  G3   full term M 6+lbs,  labor, hospitalized x1 month   G4   full term (37 weeks) F  G5    26 weeks PPROM at 25 weeks [address] SAB no procedure, no pills  G7 current    Medical history is significant for:  1  Asthma - well-maintained on albuterol  2  Recent episode of inability to move, seeing neurology  Not Taking a prenatal vitamin       No Known Allergies    Current Outpatient Medications:     albuterol (2 5 mg/3 mL) 0 083 % nebulizer solution, albuterol sulfate 2 5 mg/3 mL (0 083 %) solution for nebulization, Disp: , Rfl:     fluticasone (FLONASE) 50 mcg/act nasal spray, fluticasone propionate 50 mcg/actuation nasal spray,suspension, Disp: , Rfl:     VENTOLIN  (90 Base) MCG/ACT inhaler, Inhale 2 puffs every 4 (four) hours as needed, Disp: , Rfl: 0    Prenatal Vit-Fe Fumarate-FA (Prenatal Vitamin) 27-0 8 MG TABS, Take 1 tablet by mouth daily, Disp: 90 tablet, Rfl: 4    OBJECTIVE  Vitals:    20 1552   Temp: (!) 97 2 °F (36 2 °C)   TempSrc: Tympanic   Weight: 87 kg (191 lb 12 8 oz)         Early OB Ultrasound Procedure Note: Transvaginal US    Technician: Study performed by the interpreting physician    Indications:  Early gestation, dating & viability    Procedure Details   The entire study was done at settings of 6 0 to 8 0 MHz  Gestational Sac: Present  Yolk sac: Present  Crown-rump length is 0 71 cm cm and calculates to an estimated gestational age of 6 weeks, 4 days  Embryonic cardiac activity is seen at a rate of 162 b/min  Description of fetal anatomy Normal    Cul-de-sac: no fluid  Left ovary: not visualized  Right ovary: not visualized    Findings:  Viable, jacome intrauterine pregnancy      ASSESSMENT  Early pregnancy at 6 weeks 3 days with a calculated TIAGO of 2021 by LMP  TIAGO was not changed today  PLAN  1  Return to office for OB interview and PN-1 visit    History of  premature rupture of membranes (PPROM)  At 22 weeks, subsequently delivered at 26 weeks    Rx sent for PNV    All questions were answered and the patient expressed understanding      Leonor Brito MD

## 2020-09-30 ENCOUNTER — TELEPHONE (OUTPATIENT)
Dept: OBGYN CLINIC | Facility: CLINIC | Age: 40
End: 2020-09-30

## 2020-09-30 NOTE — TELEPHONE ENCOUNTER
----- Message from El Wang MD sent at 9/29/2020 10:34 AM EDT -----  Patient is definitely pregnant  Please schedule her for an ultrasound        Thanks!!

## 2020-11-11 PROBLEM — B37.9 CANDIDIASIS: Status: RESOLVED | Noted: 2019-07-30 | Resolved: 2020-11-11

## 2020-11-11 PROBLEM — Z30.09 ENCOUNTER FOR COUNSELING REGARDING INITIATION OF OTHER CONTRACEPTIVE MEASURE: Status: RESOLVED | Noted: 2019-01-09 | Resolved: 2020-11-11

## 2020-11-11 PROBLEM — Z30.432 ENCOUNTER FOR IUD REMOVAL: Status: RESOLVED | Noted: 2019-01-09 | Resolved: 2020-11-11

## 2020-11-11 PROBLEM — N93.0 PCB (POST COITAL BLEEDING): Status: RESOLVED | Noted: 2019-11-12 | Resolved: 2020-11-11

## 2021-11-16 ENCOUNTER — HOSPITAL ENCOUNTER (EMERGENCY)
Facility: HOSPITAL | Age: 41
Discharge: HOME | End: 2021-11-16
Attending: EMERGENCY MEDICINE
Payer: COMMERCIAL

## 2021-11-16 ENCOUNTER — APPOINTMENT (EMERGENCY)
Dept: ULTRASOUND IMAGING | Facility: HOSPITAL | Age: 41
End: 2021-11-16
Payer: COMMERCIAL

## 2021-11-16 VITALS
SYSTOLIC BLOOD PRESSURE: 120 MMHG | HEART RATE: 85 BPM | DIASTOLIC BLOOD PRESSURE: 72 MMHG | TEMPERATURE: 98 F | OXYGEN SATURATION: 97 % | RESPIRATION RATE: 18 BRPM

## 2021-11-16 DIAGNOSIS — O03.9 SPONTANEOUS MISCARRIAGE: Primary | ICD-10-CM

## 2021-11-16 LAB
ABO GROUP BLD: NORMAL
ANION GAP SERPL CALCULATED.3IONS-SCNC: 10 MMOL/L (ref 4–13)
B-HCG SERPL-ACNC: 3697 MIU/ML
BACTERIA UR QL AUTO: ABNORMAL /HPF
BASOPHILS # BLD AUTO: 0.05 THOUSANDS/ΜL (ref 0–0.1)
BASOPHILS NFR BLD AUTO: 0 % (ref 0–1)
BILIRUB UR QL STRIP: NEGATIVE
BUN SERPL-MCNC: 8 MG/DL (ref 5–25)
CALCIUM SERPL-MCNC: 8.4 MG/DL (ref 8.3–10.1)
CHLORIDE SERPL-SCNC: 107 MMOL/L (ref 100–108)
CLARITY UR: ABNORMAL
CO2 SERPL-SCNC: 25 MMOL/L (ref 21–32)
COLOR UR: YELLOW
CREAT SERPL-MCNC: 0.75 MG/DL (ref 0.6–1.3)
EOSINOPHIL # BLD AUTO: 0.09 THOUSAND/ΜL (ref 0–0.61)
EOSINOPHIL NFR BLD AUTO: 1 % (ref 0–6)
ERYTHROCYTE [DISTWIDTH] IN BLOOD BY AUTOMATED COUNT: 12.1 % (ref 11.6–15.1)
GFR SERPL CREATININE-BSD FRML MDRD: 100 ML/MIN/1.73SQ M
GLUCOSE SERPL-MCNC: 99 MG/DL (ref 65–140)
GLUCOSE UR STRIP-MCNC: NEGATIVE MG/DL
HCT VFR BLD AUTO: 39.7 % (ref 34.8–46.1)
HGB BLD-MCNC: 12.5 G/DL (ref 11.5–15.4)
HGB UR QL STRIP.AUTO: ABNORMAL
IMM GRANULOCYTES # BLD AUTO: 0.04 THOUSAND/UL (ref 0–0.2)
IMM GRANULOCYTES NFR BLD AUTO: 0 % (ref 0–2)
KETONES UR STRIP-MCNC: NEGATIVE MG/DL
LEUKOCYTE ESTERASE UR QL STRIP: NEGATIVE
LYMPHOCYTES # BLD AUTO: 2.15 THOUSANDS/ΜL (ref 0.6–4.47)
LYMPHOCYTES NFR BLD AUTO: 17 % (ref 14–44)
MCH RBC QN AUTO: 28.9 PG (ref 26.8–34.3)
MCHC RBC AUTO-ENTMCNC: 31.5 G/DL (ref 31.4–37.4)
MCV RBC AUTO: 92 FL (ref 82–98)
MONOCYTES # BLD AUTO: 0.59 THOUSAND/ΜL (ref 0.17–1.22)
MONOCYTES NFR BLD AUTO: 5 % (ref 4–12)
MUCOUS THREADS UR QL AUTO: ABNORMAL
NEUTROPHILS # BLD AUTO: 9.72 THOUSANDS/ΜL (ref 1.85–7.62)
NEUTS SEG NFR BLD AUTO: 77 % (ref 43–75)
NITRITE UR QL STRIP: NEGATIVE
NON-SQ EPI CELLS URNS QL MICRO: ABNORMAL /HPF
NRBC BLD AUTO-RTO: 0 /100 WBCS
PH UR STRIP.AUTO: 6 [PH]
PLATELET # BLD AUTO: 230 THOUSANDS/UL (ref 149–390)
PMV BLD AUTO: 10.4 FL (ref 8.9–12.7)
POTASSIUM SERPL-SCNC: 3.8 MMOL/L (ref 3.5–5.3)
PROT UR STRIP-MCNC: ABNORMAL MG/DL
RBC # BLD AUTO: 4.33 MILLION/UL (ref 3.81–5.12)
RBC #/AREA URNS AUTO: ABNORMAL /HPF
RH BLD: POSITIVE
SODIUM SERPL-SCNC: 142 MMOL/L (ref 136–145)
SP GR UR STRIP.AUTO: 1.02 (ref 1–1.03)
UROBILINOGEN UR QL STRIP.AUTO: 2 E.U./DL
WBC # BLD AUTO: 12.64 THOUSAND/UL (ref 4.31–10.16)
WBC #/AREA URNS AUTO: ABNORMAL /HPF

## 2021-11-16 PROCEDURE — 84702 CHORIONIC GONADOTROPIN TEST: CPT | Performed by: EMERGENCY MEDICINE

## 2021-11-16 PROCEDURE — 86901 BLOOD TYPING SEROLOGIC RH(D): CPT | Performed by: EMERGENCY MEDICINE

## 2021-11-16 PROCEDURE — 86900 BLOOD TYPING SEROLOGIC ABO: CPT | Performed by: EMERGENCY MEDICINE

## 2021-11-16 PROCEDURE — 36415 COLL VENOUS BLD VENIPUNCTURE: CPT | Performed by: EMERGENCY MEDICINE

## 2021-11-16 PROCEDURE — 81001 URINALYSIS AUTO W/SCOPE: CPT | Performed by: EMERGENCY MEDICINE

## 2021-11-16 PROCEDURE — 80048 BASIC METABOLIC PNL TOTAL CA: CPT | Performed by: EMERGENCY MEDICINE

## 2021-11-16 PROCEDURE — 85025 COMPLETE CBC W/AUTO DIFF WBC: CPT | Performed by: EMERGENCY MEDICINE

## 2021-11-16 PROCEDURE — 76815 OB US LIMITED FETUS(S): CPT

## 2021-11-16 PROCEDURE — 99284 EMERGENCY DEPT VISIT MOD MDM: CPT

## 2021-11-16 PROCEDURE — 99284 EMERGENCY DEPT VISIT MOD MDM: CPT | Performed by: EMERGENCY MEDICINE
